# Patient Record
Sex: FEMALE | Race: WHITE | Employment: OTHER | URBAN - METROPOLITAN AREA
[De-identification: names, ages, dates, MRNs, and addresses within clinical notes are randomized per-mention and may not be internally consistent; named-entity substitution may affect disease eponyms.]

---

## 2022-06-07 ENCOUNTER — HOSPITAL ENCOUNTER (EMERGENCY)
Age: 67
Discharge: HOME | End: 2022-06-07
Attending: EMERGENCY MEDICINE
Payer: MEDICARE

## 2022-06-07 ENCOUNTER — APPOINTMENT (OUTPATIENT)
Dept: GENERAL RADIOLOGY | Age: 67
End: 2022-06-07
Payer: MEDICARE

## 2022-06-07 VITALS
TEMPERATURE: 98.1 F | SYSTOLIC BLOOD PRESSURE: 134 MMHG | BODY MASS INDEX: 25.37 KG/M2 | RESPIRATION RATE: 18 BRPM | HEIGHT: 64 IN | DIASTOLIC BLOOD PRESSURE: 67 MMHG | HEART RATE: 65 BPM | OXYGEN SATURATION: 99 % | WEIGHT: 148.6 LBS

## 2022-06-07 DIAGNOSIS — M25.571 RIGHT ANKLE PAIN, UNSPECIFIED CHRONICITY: Primary | ICD-10-CM

## 2022-06-07 LAB
CHP ED QC CHECK: NORMAL
GLUCOSE BLD-MCNC: 93 MG/DL
GLUCOSE BLD-MCNC: 93 MG/DL (ref 70–99)
PERFORMED ON: NORMAL

## 2022-06-07 PROCEDURE — 73610 X-RAY EXAM OF ANKLE: CPT

## 2022-06-07 PROCEDURE — 99283 EMERGENCY DEPT VISIT LOW MDM: CPT

## 2022-06-07 PROCEDURE — 6370000000 HC RX 637 (ALT 250 FOR IP): Performed by: EMERGENCY MEDICINE

## 2022-06-07 RX ORDER — OXYCODONE HYDROCHLORIDE AND ACETAMINOPHEN 5; 325 MG/1; MG/1
1 TABLET ORAL EVERY 6 HOURS PRN
Qty: 10 TABLET | Refills: 0 | Status: SHIPPED | OUTPATIENT
Start: 2022-06-07 | End: 2022-06-09

## 2022-06-07 RX ORDER — OXYCODONE HYDROCHLORIDE AND ACETAMINOPHEN 5; 325 MG/1; MG/1
1 TABLET ORAL ONCE
Status: COMPLETED | OUTPATIENT
Start: 2022-06-07 | End: 2022-06-07

## 2022-06-07 RX ADMIN — OXYCODONE AND ACETAMINOPHEN 1 TABLET: 5; 325 TABLET ORAL at 03:06

## 2022-06-07 ASSESSMENT — PAIN DESCRIPTION - LOCATION: LOCATION: ANKLE

## 2022-06-07 ASSESSMENT — ENCOUNTER SYMPTOMS
VOMITING: 0
EYE PAIN: 0
DIARRHEA: 0
NAUSEA: 0
SHORTNESS OF BREATH: 0
WHEEZING: 0
COUGH: 0
ABDOMINAL PAIN: 0

## 2022-06-07 ASSESSMENT — PAIN - FUNCTIONAL ASSESSMENT
PAIN_FUNCTIONAL_ASSESSMENT: ACTIVITIES ARE NOT PREVENTED
PAIN_FUNCTIONAL_ASSESSMENT: 0-10

## 2022-06-07 ASSESSMENT — PAIN DESCRIPTION - DIRECTION: RADIATING_TOWARDS: KNEE

## 2022-06-07 ASSESSMENT — PAIN DESCRIPTION - FREQUENCY: FREQUENCY: CONTINUOUS

## 2022-06-07 ASSESSMENT — PAIN DESCRIPTION - PAIN TYPE: TYPE: ACUTE PAIN

## 2022-06-07 ASSESSMENT — PAIN DESCRIPTION - DESCRIPTORS: DESCRIPTORS: SHOOTING

## 2022-06-07 ASSESSMENT — PAIN DESCRIPTION - ONSET: ONSET: ON-GOING

## 2022-06-07 ASSESSMENT — PAIN SCALES - GENERAL: PAINLEVEL_OUTOF10: 10

## 2022-06-07 NOTE — ED PROVIDER NOTES
[address] ENCOUNTER          ATTENDING PHYSICIAN NOTE       Date of evaluation: 6/7/2022    Chief Complaint     Leg Pain      History of Present Illness     Pj Bolton is a 69 y.o. female who presents chief complaint of leg and ankle pain. The patient states that she has had 3 or 4 months of pain in her right ankle. No trauma. The pain is mostly on the medial and posterior aspect of the right ankle near the medial malleolus. She states that she has long had a callus over the medial malleolus and she believes that the pain started shortly after her daughter encouraged her to put Aquaphor on this callus to make it smaller and that is when the area became more raised and began to be painful but that is more behind this area. It had not bothered her prior to that. She reports sharp and shooting pain located in that area. Does seem to be worse at night. No particular exacerbating factors although it is somewhat alleviated by her rubbing the area. No swelling. No numbness or weakness. The pain does occasionally shoot up into her calf or distal thigh. Review of Systems     Review of Systems   Constitutional: Negative for chills and fever. HENT: Negative for congestion. Eyes: Negative for pain. Respiratory: Negative for cough, shortness of breath and wheezing. Cardiovascular: Negative for chest pain and leg swelling. Gastrointestinal: Negative for abdominal pain, diarrhea, nausea and vomiting. Genitourinary: Negative for dysuria. Musculoskeletal: Positive for arthralgias. Skin: Negative for rash. Neurological: Negative for weakness and headaches. All other systems reviewed and are negative. Past Medical, Surgical, Family, and Social History     No past medical history on file. No past surgical history on file. Her family history is not on file. She reports that she has never smoked.  She has never used smokeless tobacco. She reports current alcohol use of about 1.0 - 2.0 standard drink of alcohol per week. She reports that she does not use drugs. Medications     Previous Medications    No medications on file       Allergies     She has No Known Allergies. Physical Exam     ED Triage Vitals [06/07/22 0226]   Enc Vitals Group      BP (!) 154/82      Heart Rate 63      Resp 18      Temp 98.7 °F (37.1 °C)      Temp Source Oral      SpO2 96 %      Weight 148 lb 9.6 oz (67.4 kg)      Height 5' 4\" (1.626 m)      Head Circumference       Peak Flow       Pain Score       Pain Loc       Pain Edu? Excl. in [address]? General:  Non-toxic, no acute distress, fully cooperative with my exam    HEENT:  NCAT    Neck:  Supple    Pulmonary:   No increased work of breathing    Cardiac:  RRR, no murmur, thrill or gallop. Capillary refill <3s. 2+ distal pulses    Abdomen:  Soft, nontender, nondistended; no focal rebound or guarding    Musculoskeletal:  Grossly intact without obvious injury or deformity, normal ROM of the right ankle. Some tendeness posterior to the medial malleolus without deformity. No pain on palpation of the calcaneous, no tenderness to palpation of the achilles tendon and no pain on passive dorsiflexion. Neuro:  No focal motor deficits    Skin: There is an area of hyperkeratosis overlying the right medial malleolus without significant surrounding erythema or warmth      Diagnostic Results       RADIOLOGY:  XR ANKLE RIGHT (MIN 3 VIEWS)   Final Result   Plantar calcaneal spur. If further evaluation is clinically necessary, consider    correlation with MRI.               LABS:   Results for orders placed or performed during the hospital encounter of 06/07/22   POCT Glucose   Result Value Ref Range    Glucose 93 mg/dL    QC OK? okay    POCT Glucose   Result Value Ref Range    POC Glucose 93 70 - 99 mg/dl    Performed on ACCU-CHEK          RECENT VITALS:  BP: (!) 154/82, Temp: 98.7 °F (37.1 °C), Heart Rate: 63, Resp: 18, SpO2: 96 % Procedures         ED Course     Nursing Notes, Past Medical Hx, Past Surgical Hx, Social Hx, Allergies, and Family Hx were reviewed. The patient was given the following medications:  Orders Placed This Encounter   Medications    oxyCODONE-acetaminophen (PERCOCET) 5-325 MG per tablet 1 tablet    oxyCODONE-acetaminophen (PERCOCET) 5-325 MG per tablet     Sig: Take 1 tablet by mouth every 6 hours as needed for Pain for up to 10 doses. WARNING:  May cause drowsiness. May impair ability to operate vehicles or machinery. Do not use in combination with alcohol. Dispense:  10 tablet     Refill:  0       CONSULTS:  None    MEDICAL DECISION MAKING / ASSESSMENT / PLAN     Ирина Deleon is a 69 y.o. female with nontraumatic pain of the right ankle. Normal range of motion. No bony abnormalities. Very low suspicion for septic arthritis. Does have what appears to be an area of hyperkeratosis which may have been a corn or callus right over the medial malleolus although this is not where she is particularly tender and I see nothing to suggest cellulitis or abscess at this time. We will refer her to orthopedic surgery as an outpatient and the importance of further evaluation for possible nonemergent but clinically important etiologies of her symptoms was discussed with her. Feeling better with a single Percocet here in the emergency department. Given encouraged to continue using Tylenol ibuprofen but given a very short course of a small number of Percocet help pain at night so she can sleep. Return precautions are discussed and patient discharged in good condition. Clinical Impression     1.  Right ankle pain, unspecified chronicity        Disposition     PATIENT REFERRED TO:  Geovany Herron MD  [address]  [phone]            DISCHARGE MEDICATIONS:  New Prescriptions    OXYCODONE-ACETAMINOPHEN (PERCOCET) 5-325 MG PER TABLET    Take 1 tablet by mouth every 6 hours as needed for Pain for up to 10 doses. WARNING:  May cause drowsiness. May impair ability to operate vehicles or machinery. Do not use in combination with alcohol.        Angeli Huang MD  06/08/22 0411

## 2022-06-07 NOTE — Clinical Note
Patient discharged per MD. Vital signs obtained. No IV access. Discharge instructions reviewed with patient and .

## 2022-06-07 NOTE — ED TRIAGE NOTES
Patient arrived in the ER with c/o ankle pain. Patient states that she has had this pain in ankle for months. Patient had a callous her ankle and she begin to put Aquaphor on the wound.